# Patient Record
Sex: FEMALE | Race: WHITE | NOT HISPANIC OR LATINO | Employment: PART TIME | URBAN - METROPOLITAN AREA
[De-identification: names, ages, dates, MRNs, and addresses within clinical notes are randomized per-mention and may not be internally consistent; named-entity substitution may affect disease eponyms.]

---
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
## 2024-02-14 ENCOUNTER — HOSPITAL ENCOUNTER (EMERGENCY)
Facility: HOSPITAL | Age: 19
Discharge: HOME | End: 2024-02-14
Attending: EMERGENCY MEDICINE
Payer: MEDICAID

## 2024-02-14 VITALS
WEIGHT: 228 LBS | TEMPERATURE: 99 F | RESPIRATION RATE: 18 BRPM | DIASTOLIC BLOOD PRESSURE: 83 MMHG | OXYGEN SATURATION: 96 % | BODY MASS INDEX: 40.39 KG/M2 | HEART RATE: 110 BPM | SYSTOLIC BLOOD PRESSURE: 125 MMHG

## 2024-02-14 DIAGNOSIS — R82.71 BACTERIURIA: ICD-10-CM

## 2024-02-14 DIAGNOSIS — J10.1 INFLUENZA B: Primary | ICD-10-CM

## 2024-02-14 LAB
APPEARANCE UR: CLEAR
B-HCG SERPL QL: NEGATIVE
BACTERIA #/AREA URNS AUTO: ABNORMAL /HPF
BILIRUB UR QL STRIP.AUTO: ABNORMAL
COLOR UR AUTO: ABNORMAL
FLUAV AG UPPER RESP QL IA.RAPID: NOT DETECTED
FLUBV AG UPPER RESP QL IA.RAPID: DETECTED
GLUCOSE UR QL STRIP.AUTO: NORMAL
HYALINE CASTS #/AREA URNS LPF: ABNORMAL /LPF
KETONES UR QL STRIP.AUTO: NEGATIVE
LEUKOCYTE ESTERASE UR QL STRIP.AUTO: NEGATIVE
MUCOUS THREADS URNS QL MICRO: ABNORMAL /LPF
NITRITE UR QL STRIP.AUTO: ABNORMAL
PH UR STRIP.AUTO: 6 [PH]
PROT UR QL STRIP.AUTO: ABNORMAL
RBC #/AREA URNS AUTO: ABNORMAL /HPF
RBC UR QL AUTO: NEGATIVE
SARS-COV-2 RNA RESP QL NAA+PROBE: NOT DETECTED
SP GR UR STRIP.AUTO: 1.03 (ref 1–1.03)
SQUAMOUS #/AREA URNS LPF: ABNORMAL /HPF
UROBILINOGEN UR STRIP-ACNC: 8
WBC #/AREA URNS AUTO: ABNORMAL /HPF

## 2024-02-14 PROCEDURE — 25000003 PHARM REV CODE 250: Performed by: NURSE PRACTITIONER

## 2024-02-14 PROCEDURE — 99284 EMERGENCY DEPT VISIT MOD MDM: CPT | Mod: 25

## 2024-02-14 PROCEDURE — 96372 THER/PROPH/DIAG INJ SC/IM: CPT | Performed by: PHYSICIAN ASSISTANT

## 2024-02-14 PROCEDURE — 0240U COVID/FLU A&B PCR: CPT | Performed by: NURSE PRACTITIONER

## 2024-02-14 PROCEDURE — 63600175 PHARM REV CODE 636 W HCPCS: Performed by: PHYSICIAN ASSISTANT

## 2024-02-14 PROCEDURE — 81001 URINALYSIS AUTO W/SCOPE: CPT | Performed by: NURSE PRACTITIONER

## 2024-02-14 PROCEDURE — 81025 URINE PREGNANCY TEST: CPT | Performed by: NURSE PRACTITIONER

## 2024-02-14 RX ORDER — ONDANSETRON 4 MG/1
4 TABLET, FILM COATED ORAL EVERY 6 HOURS
Qty: 12 TABLET | Refills: 0 | Status: SHIPPED | OUTPATIENT
Start: 2024-02-14

## 2024-02-14 RX ORDER — OSELTAMIVIR PHOSPHATE 75 MG/1
75 CAPSULE ORAL 2 TIMES DAILY
Qty: 10 CAPSULE | Refills: 0 | Status: SHIPPED | OUTPATIENT
Start: 2024-02-14 | End: 2024-02-19

## 2024-02-14 RX ORDER — KETOROLAC TROMETHAMINE 30 MG/ML
60 INJECTION, SOLUTION INTRAMUSCULAR; INTRAVENOUS
Status: COMPLETED | OUTPATIENT
Start: 2024-02-14 | End: 2024-02-14

## 2024-02-14 RX ORDER — ONDANSETRON 4 MG/1
4 TABLET, ORALLY DISINTEGRATING ORAL
Status: COMPLETED | OUTPATIENT
Start: 2024-02-14 | End: 2024-02-14

## 2024-02-14 RX ADMIN — KETOROLAC TROMETHAMINE 60 MG: 30 INJECTION, SOLUTION INTRAMUSCULAR at 07:02

## 2024-02-14 RX ADMIN — ONDANSETRON 4 MG: 4 TABLET, ORALLY DISINTEGRATING ORAL at 12:02

## 2024-02-14 NOTE — FIRST PROVIDER EVALUATION
Medical screening examination initiated.  I have conducted a focused provider triage encounter, findings are as follows:    Brief history of present illness:  Patient states coughing x 1 week. States vomiting. States fever of 100.2.     There were no vitals filed for this visit.    Pertinent physical exam:  Awake, alert, ambulatory      Brief workup plan:  Labs, EKG    Preliminary workup initiated; this workup will be continued and followed by the physician or advanced practice provider that is assigned to the patient when roomed.

## 2024-02-14 NOTE — ED PROVIDER NOTES
Encounter Date: 2/14/2024       History     Chief Complaint   Patient presents with    Cough    Vomiting     C/o cough x 1 week.  now coughing so much she throws up. States fever last night, afebrile in triage.  was seen and dx with bronchitis on Friday.  was given cough meds and abx but didn't start taking meds until yesterday.      18 y.o. female presents to the ED with worsening cough for the last week with post-tussive emesis and pain with coughing. States she began having body aches, fever, headache, congestion, and rhinorrhea onset yesterday. Notes going to urgent care on 2/8, swabbed negative for covid/flu and was also diagnosed with a UTI and started on abx. Notes she went to her PCP on Friday and was given another abx and some cough medication which has not been helping. Denies abdominal pain, diarrhea, sore throat, chest pain or SOB.     The history is provided by the patient. No  was used.     Review of patient's allergies indicates:  No Known Allergies  No past medical history on file.  No past surgical history on file.  No family history on file.     Review of Systems   Constitutional:  Positive for fever. Negative for chills.   HENT:  Positive for congestion and rhinorrhea. Negative for sore throat.    Eyes:  Negative for visual disturbance.   Respiratory:  Positive for cough. Negative for shortness of breath.    Cardiovascular:  Negative for chest pain.   Gastrointestinal:  Positive for vomiting. Negative for abdominal pain and nausea.   Genitourinary:  Negative for dysuria.   Musculoskeletal:  Positive for myalgias. Negative for arthralgias.   Skin:  Negative for color change and rash.   Neurological:  Positive for headaches. Negative for dizziness.   Psychiatric/Behavioral:  Negative for behavioral problems.    All other systems reviewed and are negative.      Physical Exam     Initial Vitals [02/14/24 1232]   BP Pulse Resp Temp SpO2   117/83 91 18 98.8 °F (37.1  °C) 97 %      MAP       --         Physical Exam    Nursing note and vitals reviewed.  Constitutional: She appears well-developed and well-nourished.   HENT:   Head: Normocephalic and atraumatic.   Right Ear: External ear normal.   Left Ear: External ear normal.   Mouth/Throat: Oropharynx is clear and moist. No oropharyngeal exudate.   Eyes: EOM are normal. Pupils are equal, round, and reactive to light.   Neck: Neck supple.   Cardiovascular:  Normal rate, regular rhythm and normal heart sounds.           Pulmonary/Chest: Breath sounds normal. No respiratory distress. She has no wheezes. She has no rhonchi. She has no rales. She exhibits tenderness (diffuse).   Abdominal: Abdomen is soft. Bowel sounds are normal. She exhibits no distension. There is no abdominal tenderness. There is no rebound and no guarding.   Musculoskeletal:         General: Normal range of motion.      Cervical back: Neck supple.     Neurological: She is alert and oriented to person, place, and time. She has normal strength. GCS score is 15. GCS eye subscore is 4. GCS verbal subscore is 5. GCS motor subscore is 6.   Skin: Skin is warm and dry.   Psychiatric: She has a normal mood and affect.         ED Course   Procedures  Labs Reviewed   COVID/FLU A&B PCR - Abnormal; Notable for the following components:       Result Value    Influenza B PCR Detected (*)     All other components within normal limits    Narrative:     The Xpert Xpress SARS-CoV-2/FLU/RSV plus is a rapid, multiplexed real-time PCR test intended for the simultaneous qualitative detection and differentiation of SARS-CoV-2, Influenza A, Influenza B, and respiratory syncytial virus (RSV) viral RNA in either nasopharyngeal swab or nasal swab specimens.         URINALYSIS, REFLEX TO URINE CULTURE - Abnormal; Notable for the following components:    Color, UA Orange (*)     Protein, UA 1+ (*)     Bilirubin, UA 2+ (*)     Urobilinogen, UA 8.0 (*)     Nitrites, UA 2+ (*)     Squamous  Epithelial Cells, UA Few (*)     Mucous, UA Few (*)     Hyaline Casts, UA 3-5 (*)     All other components within normal limits   HCG QUALITATIVE URINE - Normal          Imaging Results              X-Ray Chest PA And Lateral (Final result)  Result time 02/14/24 12:47:50      Final result by Cesar Lazo MD (02/14/24 12:47:50)                   Impression:      No acute cardiopulmonary abnormality.      Electronically signed by: Cesar Lazo  Date:    02/14/2024  Time:    12:47               Narrative:    EXAMINATION:  XR CHEST PA AND LATERAL    CLINICAL HISTORY:  chest pain;    COMPARISON:  27 May 2023    FINDINGS:  PA and lateral views of the chest were obtained. Heart and mediastinum within normal limits. The lungs are clear. No pneumothorax or significant effusion.                                       Medications   ketorolac injection 60 mg (has no administration in time range)   ondansetron disintegrating tablet 4 mg (4 mg Oral Given 2/14/24 1236)     Medical Decision Making  Differential diagnosis: covid, flu, UTI, pregnancy, bronchitis, pneumonia     18 y.o. female presents to the ED with worsening cough for the last week with post-tussive emesis and pain with coughing. States she began having body aches, fever, headache, congestion, and rhinorrhea onset yesterday. Notes going to urgent care on 2/8, swabbed negative for covid/flu and was also diagnosed with a UTI and started on abx. Notes she went to her PCP on Friday and was diagnosed with bronchitis;  was given another abx and some cough medication which has not been helping. Denies abdominal pain, diarrhea, sore throat, chest pain or SOB.       Amount and/or Complexity of Data Reviewed  Labs: ordered. Decision-making details documented in ED Course.  Radiology: ordered.    Risk  Prescription drug management.               ED Course as of 02/14/24 1727   Wed Feb 14, 2024   1646 Influenza B, Molecular(!): Detected [MA]   1721 NITRITE UA(!): 2+  Patient  states she is currently on Augmentin for a UTI and notes her symptoms are improving; states she was diagnosed on 2/8 at urgent where she was initially swabbed for covid/flu [MA]   1721 hCG Qualitative, Urine: Negative [MA]      ED Course User Index  [MA] Thomas Waldrop PA-C          /83 (BP Location: Left arm, Patient Position: Sitting)   Pulse 110   Temp 98.8 °F (37.1 °C) (Oral)   Resp 18   Wt 103.4 kg (228 lb)   SpO2 96%   BMI 40.39 kg/m²                    Clinical Impression:  Final diagnoses:  [J10.1] Influenza B (Primary)  [R82.71] Bacteriuria          ED Disposition Condition    Discharge Stable          ED Prescriptions       Medication Sig Dispense Start Date End Date Auth. Provider    ondansetron (ZOFRAN) 4 MG tablet Take 1 tablet (4 mg total) by mouth every 6 (six) hours. 12 tablet 2/14/2024 -- Thomas Waldrop PA-C    oseltamivir (TAMIFLU) 75 MG capsule Take 1 capsule (75 mg total) by mouth 2 (two) times daily. for 5 days 10 capsule 2/14/2024 2/19/2024 Thomas Waldrop PA-C          Follow-up Information       Follow up With Specialties Details Why Contact Info    Deidre Salguero, ASHLEY    [address] [place] [id number] [phone]      [facility]  Emergency Dept Emergency Medicine In 1 week If symptoms worsen [address] [facility] [id number] [phone]             Thomas Waldrop PA-C  02/14/24 3352

## 2024-02-14 NOTE — Clinical Note
"Yves Limilena Salguero was seen and treated in our emergency department on 2/14/2024.  She may return to work on 02/19/2024.       If you have any questions or concerns, please don't hesitate to call.      Violeta Harris MD"